# Patient Record
(demographics unavailable — no encounter records)

---

## 2018-10-12 NOTE — MRI
MRI OF LUMBAR SPINE NONCONTRAST

10/12/18

 

HISTORY: 

Low back pain. Left leg radiculopathy. 

 

FINDINGS:  

Radiographs are not available for direct correlation. Therefore, the lowest lumbar type vertebra will
 be designated as L5 with the remainder numbered accordingly. The conus medullaris has a normal appea
anthony. Vertebral body heights are maintained. There is desiccation of all of the intervertebral discs
. 

 

T12-L1, L1-2: Mild osteophytosis. Central canal and neural foramina are patent. 

 

L2-3: Posterior disc bulge and circumferential degenerative changes. Mild stenosis of the central can
al and each neural foramen. 

 

L3-4: Disc space narrowing. Posterior disc bulge and circumferential degenerative changes. Moderate s
tenosis of the central canal and each neural foramen. 

 

L4-5: Disc space narrowing. Posterior disc bulge and circumferential degenerative changes. Moderate s
tenosis of the central canal. Moderate right and mild left foraminal stenoses. 

 

L5-S1: Minimal degenerative spondylolisthesis. Circumferential degenerative changes. Mild stenosis of
 the central canal. Moderate stenosis of each neural foramen. 

 

IMPRESSION:  

Degenerative changes throughout the lumbar spine, including central canal and foraminal stenoses as d
etailed above. 

 

POS: [facility]

## 2018-10-12 NOTE — MRI
LEFT HIP MRI WITHOUT IV CONTRAST:

 

Date:  10/12/18 

 

HISTORY:  

66-year-old female with history of osteoarthritis left hip. 

 

TECHNIQUE:  

Multiplanar, multisequence MRI examination of the left hip is performed. On the large field of view i
mages, there is evidence for bilateral fat-containing inguinal hernias, larger on the left side. Ther
e appears to be a pessary device within the vaginal canal. There is some bilateral hip joint cartilag
e loss with some hypertrophic osteophytosis changes of the femoral  head/neck junctions and the aceta
buli. There is some abnormal signal associated with the superolateral labrum on the left side, eviden
ce for degenerative type labral tear. A similar appearance is noted of the right superolateral labrum
. Mild tendinosis of the common hamstring tendon insertion regions bilaterally. No evidence for signi
ficant abnormal marrow signal to suggest avascular necrosis, fracture, or acute stress injury. No hcip
dence for acute muscle or tendon injury. 

 

IMPRESSION: 

Bilateral hip joint arthrosis and degenerative changes with some generalized cartilage loss and hyper
trophic osteophytosis. Evidence for bilateral superolateral labral degenerative type tears. Bilateral
 common hamstring tendon insertion tendinopathy. Bilateral fat-containing inguinal hernias, slightly 
larger on the left size. No significant abnormal marrow signal. 

 

 

POS: [facility]